# Patient Record
Sex: FEMALE | Race: ASIAN | NOT HISPANIC OR LATINO
[De-identification: names, ages, dates, MRNs, and addresses within clinical notes are randomized per-mention and may not be internally consistent; named-entity substitution may affect disease eponyms.]

---
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
## 2020-04-09 ENCOUNTER — APPOINTMENT (OUTPATIENT)
Dept: DERMATOLOGY | Facility: CLINIC | Age: 20
End: 2020-04-09

## 2020-06-02 ENCOUNTER — RX RENEWAL (OUTPATIENT)
Age: 20
End: 2020-06-02

## 2020-08-13 ENCOUNTER — APPOINTMENT (OUTPATIENT)
Dept: INTERNAL MEDICINE | Facility: CLINIC | Age: 20
End: 2020-08-13

## 2020-08-14 DIAGNOSIS — Z20.828 CONTACT WITH AND (SUSPECTED) EXPOSURE TO OTHER VIRAL COMMUNICABLE DISEASES: ICD-10-CM

## 2020-08-18 LAB — SARS-COV-2 N GENE NPH QL NAA+PROBE: NOT DETECTED

## 2021-03-19 ENCOUNTER — NON-APPOINTMENT (OUTPATIENT)
Age: 21
End: 2021-03-19

## 2022-03-10 ENCOUNTER — APPOINTMENT (OUTPATIENT)
Dept: INTERNAL MEDICINE | Facility: CLINIC | Age: 22
End: 2022-03-10
Payer: COMMERCIAL

## 2022-03-10 VITALS
OXYGEN SATURATION: 98 % | DIASTOLIC BLOOD PRESSURE: 72 MMHG | HEIGHT: 65 IN | WEIGHT: 125 LBS | SYSTOLIC BLOOD PRESSURE: 109 MMHG | TEMPERATURE: 97.8 F | HEART RATE: 101 BPM | BODY MASS INDEX: 20.83 KG/M2

## 2022-03-10 DIAGNOSIS — N94.6 DYSMENORRHEA, UNSPECIFIED: ICD-10-CM

## 2022-03-10 PROCEDURE — 36415 COLL VENOUS BLD VENIPUNCTURE: CPT

## 2022-03-10 PROCEDURE — 99395 PREV VISIT EST AGE 18-39: CPT | Mod: 25

## 2022-03-10 RX ORDER — DOXYCYCLINE HYCLATE 100 MG/1
100 TABLET ORAL DAILY
Qty: 30 | Refills: 2 | Status: DISCONTINUED | COMMUNITY
Start: 2019-12-31 | End: 2022-03-10

## 2022-03-10 RX ORDER — MULTIVITAMIN
TABLET ORAL
Refills: 0 | Status: ACTIVE | COMMUNITY

## 2022-03-10 NOTE — HISTORY OF PRESENT ILLNESS
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 \par \par Had some jaw pain (L); saw orthodontist and told she has a misaligned bite.\par \par Went to GYN 8/2021 and had pelvic u/s due to sharp pains during ovulation. \par Dr. Boateng [phone].\par Painful periods but monthly. \par Takes naproxen prn. \par No pap smear. \par \par Appetite good - chicken, steak, salmon. Fruits/vegetables. Has milk\par BMs daily\par Urinating well.\par \par Sleep ok.\par \par Stopped therapy due to covid. Mood has ok. \par \par Sees derm yearly for acne.\par \par No smoking. No EtOH. NO drugs.\par Lives with parents.\par Never sexually active.

## 2022-03-10 NOTE — HEALTH RISK ASSESSMENT
[0] : 2) Feeling down, depressed, or hopeless: Not at all (0) [PHQ-2 Negative - No further assessment needed] : PHQ-2 Negative - No further assessment needed [[id number]] : 0 [HIV test declined] : HIV test declined [Hepatitis C test offered] : Hepatitis C test offered

## 2022-03-10 NOTE — ASSESSMENT
[FreeTextEntry1] : Healthy 20 y/o F here for CPE.\par Doing well. No concerns.\par \par acne - controlled\par - Continue topical meds\par - F/U with Derm\par \par Dysmenorrhea\par - Continue naproxen\par \par HCM:\par - Received covid vaccine x 3\par - Tdap Jan 2022\par - Completed HPV vaccine\par - Check labs as ordered - last cholesterol 2017\par - F/U with GYN for pap - will get records\par \par RTC in 1 year or sooner prn.

## 2022-03-15 LAB
ALBUMIN SERPL ELPH-MCNC: 4.6 G/DL
ALP BLD-CCNC: 69 U/L
ALT SERPL-CCNC: 10 U/L
ANION GAP SERPL CALC-SCNC: 12 MMOL/L
AST SERPL-CCNC: 16 U/L
BASOPHILS # BLD AUTO: 0.04 K/UL
BASOPHILS NFR BLD AUTO: 0.4 %
BILIRUB SERPL-MCNC: 0.4 MG/DL
BUN SERPL-MCNC: 11 MG/DL
CALCIUM SERPL-MCNC: 9.7 MG/DL
CHLORIDE SERPL-SCNC: 104 MMOL/L
CHOLEST SERPL-MCNC: 187 MG/DL
CO2 SERPL-SCNC: 23 MMOL/L
CREAT SERPL-MCNC: 0.88 MG/DL
EGFR: 96 ML/MIN/1.73M2
EOSINOPHIL # BLD AUTO: 0.17 K/UL
EOSINOPHIL NFR BLD AUTO: 1.6 %
GLUCOSE SERPL-MCNC: 89 MG/DL
HCT VFR BLD CALC: 43.1 %
HCV AB SER QL: NONREACTIVE
HCV S/CO RATIO: 0.1 S/CO
HDLC SERPL-MCNC: 72 MG/DL
HGB BLD-MCNC: 14 G/DL
IMM GRANULOCYTES NFR BLD AUTO: 0.2 %
LDLC SERPL CALC-MCNC: 106 MG/DL
LYMPHOCYTES # BLD AUTO: 2.87 K/UL
LYMPHOCYTES NFR BLD AUTO: 27.4 %
MAN DIFF?: NORMAL
MCHC RBC-ENTMCNC: 31.2 PG
MCHC RBC-ENTMCNC: 32.5 GM/DL
MCV RBC AUTO: 96 FL
MONOCYTES # BLD AUTO: 0.58 K/UL
MONOCYTES NFR BLD AUTO: 5.5 %
NEUTROPHILS # BLD AUTO: 6.79 K/UL
NEUTROPHILS NFR BLD AUTO: 64.9 %
NONHDLC SERPL-MCNC: 115 MG/DL
PLATELET # BLD AUTO: 361 K/UL
POTASSIUM SERPL-SCNC: 4.6 MMOL/L
PROT SERPL-MCNC: 7.1 G/DL
RBC # BLD: 4.49 M/UL
RBC # FLD: 11.9 %
SODIUM SERPL-SCNC: 139 MMOL/L
TRIGL SERPL-MCNC: 45 MG/DL
WBC # FLD AUTO: 10.47 K/UL

## 2022-03-29 ENCOUNTER — RX RENEWAL (OUTPATIENT)
Age: 22
End: 2022-03-29

## 2023-02-07 ENCOUNTER — NON-APPOINTMENT (OUTPATIENT)
Age: 23
End: 2023-02-07

## 2023-11-21 ENCOUNTER — APPOINTMENT (OUTPATIENT)
Age: 23
End: 2023-11-21

## 2023-11-21 ENCOUNTER — APPOINTMENT (OUTPATIENT)
Age: 23
End: 2023-11-21
Payer: COMMERCIAL

## 2023-11-21 PROCEDURE — D1110 PROPHYLAXIS - ADULT: CPT

## 2023-11-21 PROCEDURE — D0120: CPT

## 2023-11-28 ENCOUNTER — APPOINTMENT (OUTPATIENT)
Dept: INTERNAL MEDICINE | Facility: CLINIC | Age: 23
End: 2023-11-28
Payer: COMMERCIAL

## 2023-11-28 VITALS
DIASTOLIC BLOOD PRESSURE: 71 MMHG | BODY MASS INDEX: 20.57 KG/M2 | HEIGHT: 66 IN | HEART RATE: 69 BPM | TEMPERATURE: 98.2 F | WEIGHT: 128 LBS | SYSTOLIC BLOOD PRESSURE: 108 MMHG | OXYGEN SATURATION: 99 %

## 2023-11-28 DIAGNOSIS — Z00.00 ENCOUNTER FOR GENERAL ADULT MEDICAL EXAMINATION W/OUT ABNORMAL FINDINGS: ICD-10-CM

## 2023-11-28 PROCEDURE — 99395 PREV VISIT EST AGE 18-39: CPT

## 2023-12-05 ENCOUNTER — RESULT REVIEW (OUTPATIENT)
Age: 23
End: 2023-12-05

## 2023-12-05 DIAGNOSIS — R07.81 PLEURODYNIA: ICD-10-CM

## 2023-12-07 ENCOUNTER — OUTPATIENT (OUTPATIENT)
Dept: OUTPATIENT SERVICES | Facility: HOSPITAL | Age: 23
LOS: 1 days | End: 2023-12-07
Payer: COMMERCIAL

## 2023-12-07 ENCOUNTER — APPOINTMENT (OUTPATIENT)
Dept: RADIOLOGY | Facility: IMAGING CENTER | Age: 23
End: 2023-12-07
Payer: COMMERCIAL

## 2023-12-07 DIAGNOSIS — R20.0 ANESTHESIA OF SKIN: ICD-10-CM

## 2023-12-07 DIAGNOSIS — R07.89 OTHER CHEST PAIN: ICD-10-CM

## 2023-12-07 DIAGNOSIS — R07.81 PLEURODYNIA: ICD-10-CM

## 2023-12-07 PROCEDURE — 71046 X-RAY EXAM CHEST 2 VIEWS: CPT | Mod: 26

## 2023-12-07 PROCEDURE — 72040 X-RAY EXAM NECK SPINE 2-3 VW: CPT | Mod: 26

## 2023-12-07 PROCEDURE — 72040 X-RAY EXAM NECK SPINE 2-3 VW: CPT

## 2023-12-07 PROCEDURE — 71046 X-RAY EXAM CHEST 2 VIEWS: CPT

## 2023-12-08 ENCOUNTER — TRANSCRIPTION ENCOUNTER (OUTPATIENT)
Age: 23
End: 2023-12-08

## 2023-12-21 ENCOUNTER — APPOINTMENT (OUTPATIENT)
Dept: INTERNAL MEDICINE | Facility: CLINIC | Age: 23
End: 2023-12-21
Payer: COMMERCIAL

## 2023-12-21 VITALS
SYSTOLIC BLOOD PRESSURE: 112 MMHG | DIASTOLIC BLOOD PRESSURE: 78 MMHG | HEART RATE: 65 BPM | WEIGHT: 125 LBS | RESPIRATION RATE: 15 BRPM | OXYGEN SATURATION: 99 %

## 2023-12-21 PROCEDURE — 99214 OFFICE O/P EST MOD 30 MIN: CPT

## 2023-12-22 ENCOUNTER — NON-APPOINTMENT (OUTPATIENT)
Age: 23
End: 2023-12-22

## 2023-12-30 NOTE — HISTORY OF PRESENT ILLNESS
[FreeTextEntry8] : Has had multiple concerns x 5-6 weeks.  Areas of pain in upper back, L ribs, and sternum. Comes and goes. Has gotten somewhat better with ibuprofen and much milder, but still present.  Numbness/tingling in the L chest wall.  Neck pain - on the L side.  No strenuous activity  Does recall now that she fell while killing a bug and fell onto her shins 6 weeks ago, and fell onto the table as was standing on it. Had bruising but resolved.  Otherwise feels well without joint pain, fevers. Eating well. Normal output.

## 2023-12-30 NOTE — ASSESSMENT
[FreeTextEntry1] : 24 y/o F with 5-6 week history of L thoracic wall/sternal/scapular pain. Otherwise well. Improved with motrin. Did have fall preceding onset. Normal cxr and cervical spine xray on 12/11. Unclear etiology, but given recent fall, may be muscular strain. However, will check inflammatory markers for autoimmune process. - Check labs as ordered - Continue NSAIDs prn - If sx persist, consider, thoracic and cervical spine MRI

## 2023-12-30 NOTE — PHYSICAL EXAM
[Normal] : normal rate, regular rhythm, normal S1 and S2 and no murmur heard [de-identified] : Full ROM of neck and L shoulder. No tenderness to palpation along L scapula, shoulder or along L chest wall. No bruising.

## 2024-01-02 ENCOUNTER — APPOINTMENT (OUTPATIENT)
Dept: MRI IMAGING | Facility: IMAGING CENTER | Age: 24
End: 2024-01-02

## 2024-01-02 ENCOUNTER — OUTPATIENT (OUTPATIENT)
Dept: OUTPATIENT SERVICES | Facility: HOSPITAL | Age: 24
LOS: 1 days | End: 2024-01-02
Payer: COMMERCIAL

## 2024-01-02 ENCOUNTER — APPOINTMENT (OUTPATIENT)
Dept: MRI IMAGING | Facility: IMAGING CENTER | Age: 24
End: 2024-01-02
Payer: COMMERCIAL

## 2024-01-02 DIAGNOSIS — G56.92 UNSPECIFIED MONONEUROPATHY OF LEFT UPPER LIMB: ICD-10-CM

## 2024-01-02 DIAGNOSIS — Z00.8 ENCOUNTER FOR OTHER GENERAL EXAMINATION: ICD-10-CM

## 2024-01-02 LAB
ALBUMIN SERPL ELPH-MCNC: 4.6 G/DL
ALP BLD-CCNC: 73 U/L
ALT SERPL-CCNC: 9 U/L
ANION GAP SERPL CALC-SCNC: 13 MMOL/L
AST SERPL-CCNC: 17 U/L
BILIRUB SERPL-MCNC: 0.4 MG/DL
BUN SERPL-MCNC: 9 MG/DL
CALCIUM SERPL-MCNC: 9.8 MG/DL
CHLORIDE SERPL-SCNC: 104 MMOL/L
CHOLEST SERPL-MCNC: 198 MG/DL
CO2 SERPL-SCNC: 23 MMOL/L
CREAT SERPL-MCNC: 0.86 MG/DL
CRP SERPL-MCNC: <3 MG/L
EGFR: 97 ML/MIN/1.73M2
ERYTHROCYTE [SEDIMENTATION RATE] IN BLOOD BY WESTERGREN METHOD: 8 MM/HR
ESTIMATED AVERAGE GLUCOSE: 105 MG/DL
FOLATE SERPL-MCNC: 15.2 NG/ML
GLUCOSE SERPL-MCNC: 82 MG/DL
HBA1C MFR BLD HPLC: 5.3 %
HCT VFR BLD CALC: 41.2 %
HDLC SERPL-MCNC: 71 MG/DL
HGB BLD-MCNC: 13.4 G/DL
LDLC SERPL CALC-MCNC: 117 MG/DL
MCHC RBC-ENTMCNC: 30 PG
MCHC RBC-ENTMCNC: 32.5 GM/DL
MCV RBC AUTO: 92.2 FL
NONHDLC SERPL-MCNC: 127 MG/DL
PLATELET # BLD AUTO: 324 K/UL
POTASSIUM SERPL-SCNC: 4.3 MMOL/L
PROT SERPL-MCNC: 7.1 G/DL
RBC # BLD: 4.47 M/UL
RBC # FLD: 11.9 %
SODIUM SERPL-SCNC: 139 MMOL/L
TRIGL SERPL-MCNC: 53 MG/DL
VIT B12 SERPL-MCNC: 556 PG/ML
WBC # FLD AUTO: 7.53 K/UL

## 2024-01-02 PROCEDURE — 72146 MRI CHEST SPINE W/O DYE: CPT | Mod: 26

## 2024-01-02 PROCEDURE — 72141 MRI NECK SPINE W/O DYE: CPT

## 2024-01-02 PROCEDURE — 72146 MRI CHEST SPINE W/O DYE: CPT

## 2024-01-02 PROCEDURE — 72141 MRI NECK SPINE W/O DYE: CPT | Mod: 26

## 2024-01-08 DIAGNOSIS — E04.1 NONTOXIC SINGLE THYROID NODULE: ICD-10-CM

## 2024-01-08 DIAGNOSIS — M94.0 CHONDROCOSTAL JUNCTION SYNDROME [TIETZE]: ICD-10-CM

## 2024-01-08 DIAGNOSIS — D49.7 NEOPLASM OF UNSPECIFIED BEHAVIOR OF ENDOCRINE GLANDS AND OTHER PARTS OF NERVOUS SYSTEM: ICD-10-CM

## 2024-01-12 ENCOUNTER — APPOINTMENT (OUTPATIENT)
Dept: PHYSICAL MEDICINE AND REHAB | Facility: CLINIC | Age: 24
End: 2024-01-12
Payer: COMMERCIAL

## 2024-01-12 ENCOUNTER — OUTPATIENT (OUTPATIENT)
Dept: OUTPATIENT SERVICES | Facility: HOSPITAL | Age: 24
LOS: 1 days | End: 2024-01-12
Payer: COMMERCIAL

## 2024-01-12 ENCOUNTER — APPOINTMENT (OUTPATIENT)
Dept: ULTRASOUND IMAGING | Facility: IMAGING CENTER | Age: 24
End: 2024-01-12
Payer: COMMERCIAL

## 2024-01-12 ENCOUNTER — NON-APPOINTMENT (OUTPATIENT)
Age: 24
End: 2024-01-12

## 2024-01-12 DIAGNOSIS — D49.7 NEOPLASM OF UNSPECIFIED BEHAVIOR OF ENDOCRINE GLANDS AND OTHER PARTS OF NERVOUS SYSTEM: ICD-10-CM

## 2024-01-12 DIAGNOSIS — E04.1 NONTOXIC SINGLE THYROID NODULE: ICD-10-CM

## 2024-01-12 DIAGNOSIS — R20.0 ANESTHESIA OF SKIN: ICD-10-CM

## 2024-01-12 PROCEDURE — 76536 US EXAM OF HEAD AND NECK: CPT

## 2024-01-12 PROCEDURE — 99204 OFFICE O/P NEW MOD 45 MIN: CPT

## 2024-01-12 PROCEDURE — 76536 US EXAM OF HEAD AND NECK: CPT | Mod: 26

## 2024-01-12 NOTE — PHYSICAL EXAM
[Normal] : Alert and in no acute distress [de-identified] : no pain with cervical spine ROM, negative spurlings, +trigger points left trapezius  [de-identified] : breathing comfortably [de-identified] : warm, well perfused  [de-identified] : strength 5/5, gait normal +tinel's left elbow  [de-identified] : reflexes symmetric, sensation intact to light touch

## 2024-01-12 NOTE — HISTORY OF PRESENT ILLNESS
[FreeTextEntry1] : Patient is a 23 year old woman who presents with her mother for evaluation. She complains of neck pain x more than one month, left side to left upper back, arm, with numbness and tingling in left arm x 2 weeks. Symptoms come and go, she has been taking Ibuprofen as needed for pain. No PT. Xray cervical and MRI of cervical and thoracic spine were done January 2024 with no disc herniations, no narrowing at any level. Patient works for [employer], no changes in job requirements, sits at desk. Also has discomfort in anterior chest/sternum, left chest wall. Feels her left hand  is weaker.

## 2024-01-12 NOTE — ASSESSMENT
[FreeTextEntry1] : 23 year old woman with neck pain, left side to left arm, with numbness left chest wall/anterior chest pain previous notes, MRI reviewed, no disc herniations/narrowing noted  start PT for ROM, stretching, HEP daily stretching at home, with heat ibuprofen with food, as needed  discussed muscle relaxants, trigger point injections will do EMG upper extremities to r/o ulnar nerve entrapment patient to avoid leaning on left elbow patient to call office with any worsening pain, new weakness

## 2024-01-12 NOTE — REVIEW OF SYSTEMS
[Fever] : no fever [Chills] : no chills [Chest Pain] : no chest pain [Palpitations] : no palpitations [Shortness Of Breath] : no shortness of breath [Headache] : no headaches [Dizziness] : no dizziness

## 2024-01-17 ENCOUNTER — NON-APPOINTMENT (OUTPATIENT)
Age: 24
End: 2024-01-17

## 2024-01-17 ENCOUNTER — APPOINTMENT (OUTPATIENT)
Dept: CARDIOLOGY | Facility: CLINIC | Age: 24
End: 2024-01-17
Payer: COMMERCIAL

## 2024-01-17 VITALS
SYSTOLIC BLOOD PRESSURE: 115 MMHG | BODY MASS INDEX: 20.09 KG/M2 | HEIGHT: 66 IN | HEART RATE: 101 BPM | DIASTOLIC BLOOD PRESSURE: 80 MMHG | TEMPERATURE: 98 F | WEIGHT: 125 LBS | OXYGEN SATURATION: 98 %

## 2024-01-17 DIAGNOSIS — R07.89 OTHER CHEST PAIN: ICD-10-CM

## 2024-01-17 DIAGNOSIS — E78.00 PURE HYPERCHOLESTEROLEMIA, UNSPECIFIED: ICD-10-CM

## 2024-01-17 PROCEDURE — 93000 ELECTROCARDIOGRAM COMPLETE: CPT

## 2024-01-17 PROCEDURE — 99204 OFFICE O/P NEW MOD 45 MIN: CPT

## 2024-01-25 PROBLEM — R07.89 ATYPICAL CHEST PAIN: Status: ACTIVE | Noted: 2023-12-05

## 2024-01-26 ENCOUNTER — APPOINTMENT (OUTPATIENT)
Dept: PHYSICAL MEDICINE AND REHAB | Facility: CLINIC | Age: 24
End: 2024-01-26
Payer: COMMERCIAL

## 2024-01-26 VITALS
OXYGEN SATURATION: 100 % | DIASTOLIC BLOOD PRESSURE: 74 MMHG | HEART RATE: 93 BPM | RESPIRATION RATE: 14 BRPM | SYSTOLIC BLOOD PRESSURE: 116 MMHG | TEMPERATURE: 98.2 F

## 2024-01-26 PROCEDURE — 95885 MUSC TST DONE W/NERV TST LIM: CPT

## 2024-01-26 PROCEDURE — 95911 NRV CNDJ TEST 9-10 STUDIES: CPT

## 2024-01-26 NOTE — ASSESSMENT
[FreeTextEntry1] : 23 year old woman with left arm pain, numbness EMG/NCV with no evidence for nerve entrapment or radiculopathy on left  patient will continue with PT

## 2024-01-26 NOTE — PROCEDURE
[Consent] : Prior to procedure, consent was given by patient or guardian [Explanation of Risk/Benefits] : Explanation of risk/benefits provided [Nerve Conduction Velocity] : Nerve Conduction Velocity conducted. The following nerves were tested: [EMG] : EMG testing conducted. The following Muscles were tested;

## 2024-03-01 ENCOUNTER — APPOINTMENT (OUTPATIENT)
Dept: PHYSICAL MEDICINE AND REHAB | Facility: CLINIC | Age: 24
End: 2024-03-01
Payer: COMMERCIAL

## 2024-03-01 DIAGNOSIS — M79.18 MYALGIA, OTHER SITE: ICD-10-CM

## 2024-03-01 PROCEDURE — 99213 OFFICE O/P EST LOW 20 MIN: CPT

## 2024-03-01 RX ORDER — CYCLOBENZAPRINE HYDROCHLORIDE 5 MG/1
5 TABLET, FILM COATED ORAL
Qty: 20 | Refills: 1 | Status: ACTIVE | COMMUNITY
Start: 2024-03-01 | End: 1900-01-01

## 2024-03-01 NOTE — PHYSICAL EXAM
[Normal] : Alert and in no acute distress [de-identified] : no pain with cervical spine ROM, negative spurlings, +trigger points left trapezius  [de-identified] : breathing comfortably [de-identified] : warm, well perfused  [de-identified] : strength 5/5, gait normal left shoulder pain with end range of motion, full int/ext rotation, +Neers [de-identified] : reflexes symmetric, sensation intact to light touch

## 2024-03-01 NOTE — HISTORY OF PRESENT ILLNESS
[FreeTextEntry1] : Patient is a 23 year old woman who presents with her mother for follow up. She was last seen in January. Today, she reports her pain is better, more localized around the left shoulder and neck. She was previously also having pain in chest/ribs/trunk. Physical therapy gives her some relief, but pain returns, now in upper back and posterior shoulder. She feels a clicking sensation when she moves her left shoulder, reaching, with pain on certain positions. There is some occasional numbness to left forearm, but mostly to left upper arm. Her neck and upper back feel tight to left ear/jaw. No weakness reported. She is stretching on her own, usually sleeps at 1 am. Works at a desk. No other activities like dancing, swimming. Takes advil/tylenol as needed for pain

## 2024-03-01 NOTE — ASSESSMENT
[FreeTextEntry1] : 23 year old woman with left shoulder, upper back pain/neck pain previous notes reviewed pain started after standing on/falling from table onto her knees some improvement with PT continue PT, daily stretching, with heat MRI left shoulder, will call patient with results  previous imaging cervical/thoracic spine with no disc herniations flexeril at night 5-10 mg for spasm meloxicam daily x 1-2 weeks with food, then as needed  discussed trigger point injections

## 2024-03-09 ENCOUNTER — APPOINTMENT (OUTPATIENT)
Dept: MRI IMAGING | Facility: IMAGING CENTER | Age: 24
End: 2024-03-09
Payer: COMMERCIAL

## 2024-03-09 ENCOUNTER — OUTPATIENT (OUTPATIENT)
Dept: OUTPATIENT SERVICES | Facility: HOSPITAL | Age: 24
LOS: 1 days | End: 2024-03-09
Payer: COMMERCIAL

## 2024-03-09 DIAGNOSIS — M25.512 PAIN IN LEFT SHOULDER: ICD-10-CM

## 2024-03-09 DIAGNOSIS — Z00.8 ENCOUNTER FOR OTHER GENERAL EXAMINATION: ICD-10-CM

## 2024-03-09 PROCEDURE — 73221 MRI JOINT UPR EXTREM W/O DYE: CPT

## 2024-03-09 PROCEDURE — 73221 MRI JOINT UPR EXTREM W/O DYE: CPT | Mod: 26,LT

## 2024-03-12 ENCOUNTER — APPOINTMENT (OUTPATIENT)
Dept: INTERNAL MEDICINE | Facility: CLINIC | Age: 24
End: 2024-03-12
Payer: COMMERCIAL

## 2024-03-12 VITALS
HEIGHT: 66 IN | SYSTOLIC BLOOD PRESSURE: 104 MMHG | HEART RATE: 87 BPM | DIASTOLIC BLOOD PRESSURE: 71 MMHG | TEMPERATURE: 99.3 F | OXYGEN SATURATION: 98 % | BODY MASS INDEX: 20.57 KG/M2 | WEIGHT: 128 LBS

## 2024-03-12 DIAGNOSIS — G56.92 UNSPECIFIED MONONEUROPATHY OF LEFT UPPER LIMB: ICD-10-CM

## 2024-03-12 PROCEDURE — 99214 OFFICE O/P EST MOD 30 MIN: CPT

## 2024-03-12 NOTE — REVIEW OF SYSTEMS
[Muscle Pain] : muscle pain [Joint Stiffness] : joint stiffness [Back Pain] : back pain [Negative] : Gastrointestinal

## 2024-03-12 NOTE — ASSESSMENT
[FreeTextEntry1] : 24 y/o F with LUE/L shoulder pain/tingling since Nov 2023. Had mid-sternal and mid upper back pain which has improved. ROM of L shoulder has improved. Suspect more likely MSK injury rather than rheum/autoimmune (had negative labs in Dec 2023). - Would continue PT, stretches, heat - Awaiting MRI read of L shoulder done on 3/9 - Continue PM&R follow up - Can re-try meloxicam (previously tolerated NSIADs)

## 2024-03-12 NOTE — PHYSICAL EXAM
[Normal] : no carotid or abdominal bruits heard, no varicosities, pedal pulses are present, no peripheral edema, no extremity clubbing or cyanosis and no palpable aorta [Grossly Normal Strength/Tone] : grossly normal strength/tone [de-identified] : Full ROM of L shoulder. 5/5 strength bilaterally. Slightly decreased sensation to light touch on L hand. Otherwise normal sensation. No tenderness to palpation of neck/shoulder/ribs bilaterally.

## 2024-03-12 NOTE — HISTORY OF PRESENT ILLNESS
[FreeTextEntry1] : Follow up [de-identified] : Continues to have pain in upper back and posterior shoulder. Saw PM&R on 3/1. Referred for L shoulder MRI and advised to take flexeril and meloxicam.  Mid sternal and midline back pain have improved with PT stretching. Bilateral ribs continue to hurt. Also with L upper chest pain and L shoulder pain extending to the upper arm with soreness to elbow.  R arm is fine. Mild soreness in R lower neck. L scapular does improve.  Pain can occur at rest. Now with tingling all along LUE. Used to be just in the fingers.   Took cyclobenzaprine and meloxicam and then developed hives after 2 doses.  No known h/o autoimmune or rheumatologic disease in her family. Mom has arthritis No rashes.  Knees will pop but otherwise no stiffness/joint pain.   Continues PT weekly and doing heating pad and home stretches.  No malar rash.  Used to play violin a long time ago. Did have a fall while reaching for something/standing on a table prior to the onset of this.

## 2024-03-25 ENCOUNTER — RX RENEWAL (OUTPATIENT)
Age: 24
End: 2024-03-25

## 2024-03-25 RX ORDER — MELOXICAM 15 MG/1
15 TABLET ORAL
Qty: 30 | Refills: 0 | Status: ACTIVE | COMMUNITY
Start: 2024-03-01 | End: 1900-01-01

## 2024-03-28 ENCOUNTER — APPOINTMENT (OUTPATIENT)
Dept: INTERNAL MEDICINE | Facility: CLINIC | Age: 24
End: 2024-03-28

## 2024-03-29 ENCOUNTER — OUTPATIENT (OUTPATIENT)
Dept: OUTPATIENT SERVICES | Facility: HOSPITAL | Age: 24
LOS: 1 days | End: 2024-03-29
Payer: COMMERCIAL

## 2024-03-29 ENCOUNTER — APPOINTMENT (OUTPATIENT)
Dept: CV DIAGNOSITCS | Facility: HOSPITAL | Age: 24
End: 2024-03-29

## 2024-03-29 ENCOUNTER — RESULT REVIEW (OUTPATIENT)
Age: 24
End: 2024-03-29

## 2024-03-29 ENCOUNTER — TRANSCRIPTION ENCOUNTER (OUTPATIENT)
Age: 24
End: 2024-03-29

## 2024-03-29 DIAGNOSIS — R07.89 OTHER CHEST PAIN: ICD-10-CM

## 2024-03-29 PROCEDURE — 93306 TTE W/DOPPLER COMPLETE: CPT | Mod: 26

## 2024-04-01 ENCOUNTER — TRANSCRIPTION ENCOUNTER (OUTPATIENT)
Age: 24
End: 2024-04-01

## 2024-04-01 ENCOUNTER — APPOINTMENT (OUTPATIENT)
Dept: RHEUMATOLOGY | Facility: CLINIC | Age: 24
End: 2024-04-01
Payer: COMMERCIAL

## 2024-04-01 VITALS
HEIGHT: 66 IN | HEART RATE: 106 BPM | WEIGHT: 130 LBS | BODY MASS INDEX: 20.89 KG/M2 | DIASTOLIC BLOOD PRESSURE: 79 MMHG | OXYGEN SATURATION: 98 % | SYSTOLIC BLOOD PRESSURE: 114 MMHG

## 2024-04-01 DIAGNOSIS — M79.89 OTHER SPECIFIED SOFT TISSUE DISORDERS: ICD-10-CM

## 2024-04-01 DIAGNOSIS — R07.89 OTHER CHEST PAIN: ICD-10-CM

## 2024-04-01 PROCEDURE — 99204 OFFICE O/P NEW MOD 45 MIN: CPT

## 2024-04-01 NOTE — PHYSICAL EXAM
[General Appearance - Alert] : alert [General Appearance - In No Acute Distress] : in no acute distress [Impaired Insight] : insight and judgment were intact [Respiration, Rhythm And Depth] : normal respiratory rhythm and effort [Musculoskeletal - Swelling] : no joint swelling seen [FreeTextEntry1] : tenderness prominence under left axilla  [Motor Tone] : muscle strength and tone were normal

## 2024-04-01 NOTE — CONSULT LETTER
[Dear  ___] : Dear  [unfilled], [Please see my note below.] : Please see my note below. [Sincerely,] : Sincerely, [FreeTextEntry3] : Natalia Jerome MD , [facility] at [facility] Division of Rheumatology

## 2024-04-01 NOTE — HISTORY OF PRESENT ILLNESS
[FreeTextEntry1] : Referred for evaluation of joint pain -Onset around October 2023 -pain in the left shoulder, radiating to the upper neck and down the arm/ hand initially with numbness of the entire arm/ hand, now only neck and upper arm no weakness no other joint pain or swelling no rashes no dry eyes or mouth no Raynaud's  had transient chest pain, resolved denies any preceding trauma or injury to the area noticed some swelling under left axilla  no personal of family history of psoriasis or IBD no history of back pain   -saw PMD- labs unrevealing -saw PMR EMG unrevealing  -MRI shoulder unrevealing

## 2024-04-01 NOTE — DATA REVIEWED
[FreeTextEntry1] : Labs, imaging and chart notes reviewed today with patient  MRI shoulder unrevealing  normal inflammatory markers

## 2024-04-01 NOTE — ASSESSMENT
[FreeTextEntry1] : Left shoulder/nek pain, radiating to the upper neck and arm/ hand No preceding trauma or injury No weakness or synovitis on exam Normal EMG and imaging Normal inflammatory markers Rheumatologic ROS unrevealing ~doubt inflammatory process given above would recheck ESR and CRP today continue with PT and NSAIDs + muscle relaxant as needed if worsening symptoms would reassess and send additional work up  # swelling left axilla reported by patient dedicated US ordered

## 2024-04-02 ENCOUNTER — OUTPATIENT (OUTPATIENT)
Dept: OUTPATIENT SERVICES | Facility: HOSPITAL | Age: 24
LOS: 1 days | End: 2024-04-02
Payer: COMMERCIAL

## 2024-04-02 ENCOUNTER — APPOINTMENT (OUTPATIENT)
Dept: ULTRASOUND IMAGING | Facility: IMAGING CENTER | Age: 24
End: 2024-04-02
Payer: COMMERCIAL

## 2024-04-02 DIAGNOSIS — M79.89 OTHER SPECIFIED SOFT TISSUE DISORDERS: ICD-10-CM

## 2024-04-02 PROCEDURE — 76882 US LMTD JT/FCL EVL NVASC XTR: CPT

## 2024-04-02 PROCEDURE — 76882 US LMTD JT/FCL EVL NVASC XTR: CPT | Mod: 26,LT

## 2024-04-05 ENCOUNTER — TRANSCRIPTION ENCOUNTER (OUTPATIENT)
Age: 24
End: 2024-04-05

## 2024-04-08 ENCOUNTER — TRANSCRIPTION ENCOUNTER (OUTPATIENT)
Age: 24
End: 2024-04-08

## 2024-04-14 ENCOUNTER — NON-APPOINTMENT (OUTPATIENT)
Age: 24
End: 2024-04-14

## 2024-04-14 LAB
CRP SERPL-MCNC: <3 MG/L
ERYTHROCYTE [SEDIMENTATION RATE] IN BLOOD BY WESTERGREN METHOD: 15 MM/HR

## 2024-04-15 ENCOUNTER — TRANSCRIPTION ENCOUNTER (OUTPATIENT)
Age: 24
End: 2024-04-15

## 2024-04-18 ENCOUNTER — TRANSCRIPTION ENCOUNTER (OUTPATIENT)
Age: 24
End: 2024-04-18

## 2024-05-30 ENCOUNTER — APPOINTMENT (OUTPATIENT)
Dept: OTOLARYNGOLOGY | Facility: CLINIC | Age: 24
End: 2024-05-30
Payer: COMMERCIAL

## 2024-05-30 VITALS
DIASTOLIC BLOOD PRESSURE: 75 MMHG | SYSTOLIC BLOOD PRESSURE: 107 MMHG | HEART RATE: 69 BPM | BODY MASS INDEX: 20.89 KG/M2 | OXYGEN SATURATION: 99 % | HEIGHT: 66 IN | WEIGHT: 130 LBS

## 2024-05-30 DIAGNOSIS — H92.02 OTALGIA, LEFT EAR: ICD-10-CM

## 2024-05-30 PROCEDURE — 99203 OFFICE O/P NEW LOW 30 MIN: CPT

## 2024-06-03 NOTE — CONSULT LETTER
[Dear  ___] : Dear  [unfilled], [Consult Letter:] : I had the pleasure of evaluating your patient, [unfilled]. [Sincerely,] : Sincerely, [Consult Closing:] : Thank you very much for allowing me to participate in the care of this patient.  If you have any questions, please do not hesitate to contact me. [Please see my note below.] : Please see my note below. [FreeTextEntry3] : Katrin Miramontes MD Otolaryngology- Facial Plastics  [address] [place] [id number] (P) - [phone] (F) - [phone]

## 2024-06-03 NOTE — ASSESSMENT
[FreeTextEntry1] : Ms. MARLOW is a 23 year female with L otalgia for a month now improving  - we discussed this is likely muscular and could be due to the recent completion of Invisilign - would rec Myofascial release at this point - Mandible xray due to clicking of L jaw - will call with results - f/u tbd

## 2024-06-03 NOTE — END OF VISIT
[FreeTextEntry3] : I personally saw and examined MICHAEL MARLOW  in detail. I spoke to SANJEEV Choe regarding the assessment and plan of care. I performed the procedures and relevant physical exam. I have made changes to the body of the note wherever necessary and appropriate.

## 2024-06-03 NOTE — HISTORY OF PRESENT ILLNESS
[de-identified] : Ms. MARLOW is a 23 year female c/o L ear tenderness / pressure and muffling which she first noticed a month ago, episodes lasted a few seconds, she had L tinnitus as well which would come and go and is now less frequent. pain would radiate to her L cheek recently seen by another ENT and told she has inflammation in the jaw joint, she has been using warm compresses which seems to have helped, she reports audio was normal - denies nasal congestion - pain started 2 weeks after Invisilign was completed

## 2024-06-03 NOTE — PHYSICAL EXAM
[Midline] : trachea located in midline position [Normal] : temporomandibular joint is normal [de-identified] : mild hypertonicity of masseters b/l  [de-identified] : no clicking

## 2024-06-05 ENCOUNTER — OUTPATIENT (OUTPATIENT)
Dept: OUTPATIENT SERVICES | Facility: HOSPITAL | Age: 24
LOS: 1 days | End: 2024-06-05
Payer: COMMERCIAL

## 2024-06-05 ENCOUNTER — APPOINTMENT (OUTPATIENT)
Dept: RADIOLOGY | Facility: IMAGING CENTER | Age: 24
End: 2024-06-05
Payer: COMMERCIAL

## 2024-06-05 DIAGNOSIS — H92.02 OTALGIA, LEFT EAR: ICD-10-CM

## 2024-06-05 PROCEDURE — 70110 X-RAY EXAM OF JAW 4/> VIEWS: CPT

## 2024-06-05 PROCEDURE — 70110 X-RAY EXAM OF JAW 4/> VIEWS: CPT | Mod: 26

## 2024-06-10 ENCOUNTER — NON-APPOINTMENT (OUTPATIENT)
Age: 24
End: 2024-06-10

## 2024-06-17 ENCOUNTER — APPOINTMENT (OUTPATIENT)
Dept: DERMATOLOGY | Facility: CLINIC | Age: 24
End: 2024-06-17
Payer: COMMERCIAL

## 2024-06-17 VITALS — HEIGHT: 66 IN | BODY MASS INDEX: 20.89 KG/M2 | WEIGHT: 130 LBS

## 2024-06-17 DIAGNOSIS — Z12.83 ENCOUNTER FOR SCREENING FOR MALIGNANT NEOPLASM OF SKIN: ICD-10-CM

## 2024-06-17 DIAGNOSIS — D48.5 NEOPLASM OF UNCERTAIN BEHAVIOR OF SKIN: ICD-10-CM

## 2024-06-17 DIAGNOSIS — D22.9 MELANOCYTIC NEVI, UNSPECIFIED: ICD-10-CM

## 2024-06-17 DIAGNOSIS — L70.0 ACNE VULGARIS: ICD-10-CM

## 2024-06-17 DIAGNOSIS — D23.9 OTHER BENIGN NEOPLASM OF SKIN, UNSPECIFIED: ICD-10-CM

## 2024-06-17 DIAGNOSIS — Z78.9 OTHER SPECIFIED HEALTH STATUS: ICD-10-CM

## 2024-06-17 DIAGNOSIS — L21.9 SEBORRHEIC DERMATITIS, UNSPECIFIED: ICD-10-CM

## 2024-06-17 DIAGNOSIS — Z80.8 FAMILY HISTORY OF MALIGNANT NEOPLASM OF OTHER ORGANS OR SYSTEMS: ICD-10-CM

## 2024-06-17 PROCEDURE — 99204 OFFICE O/P NEW MOD 45 MIN: CPT | Mod: 25

## 2024-06-17 PROCEDURE — 11102 TANGNTL BX SKIN SINGLE LES: CPT

## 2024-06-17 RX ORDER — TRETINOIN 0.25 MG/G
0.03 CREAM TOPICAL
Qty: 1 | Refills: 2 | Status: ACTIVE | COMMUNITY
Start: 2019-12-31 | End: 1900-01-01

## 2024-06-17 RX ORDER — CLINDAMYCIN PHOSPHATE 10 MG/ML
1 SOLUTION TOPICAL
Qty: 1 | Refills: 10 | Status: ACTIVE | COMMUNITY
Start: 2018-12-31 | End: 1900-01-01

## 2024-06-17 RX ORDER — KETOCONAZOLE 20.5 MG/ML
2 SHAMPOO, SUSPENSION TOPICAL
Qty: 1 | Refills: 5 | Status: ACTIVE | COMMUNITY
Start: 2024-06-17 | End: 1900-01-01

## 2024-06-17 RX ORDER — BENZOYL PEROXIDE 5 G/100G
5 LIQUID TOPICAL
Qty: 1 | Refills: 3 | Status: ACTIVE | COMMUNITY
Start: 2024-06-17 | End: 1900-01-01

## 2024-06-18 ENCOUNTER — APPOINTMENT (OUTPATIENT)
Dept: INTERNAL MEDICINE | Facility: CLINIC | Age: 24
End: 2024-06-18
Payer: COMMERCIAL

## 2024-06-18 VITALS
SYSTOLIC BLOOD PRESSURE: 128 MMHG | WEIGHT: 134 LBS | HEART RATE: 71 BPM | OXYGEN SATURATION: 99 % | HEIGHT: 66 IN | BODY MASS INDEX: 21.53 KG/M2 | TEMPERATURE: 98.5 F | DIASTOLIC BLOOD PRESSURE: 80 MMHG

## 2024-06-18 DIAGNOSIS — M54.2 CERVICALGIA: ICD-10-CM

## 2024-06-18 DIAGNOSIS — G89.29 CERVICALGIA: ICD-10-CM

## 2024-06-18 DIAGNOSIS — M25.512 PAIN IN LEFT SHOULDER: ICD-10-CM

## 2024-06-18 DIAGNOSIS — M25.50 PAIN IN UNSPECIFIED JOINT: ICD-10-CM

## 2024-06-18 PROCEDURE — 99213 OFFICE O/P EST LOW 20 MIN: CPT

## 2024-06-18 RX ORDER — NAPROXEN 500 MG/1
500 TABLET ORAL
Qty: 30 | Refills: 0 | Status: ACTIVE | COMMUNITY
Start: 2019-03-21 | End: 1900-01-01

## 2024-06-18 NOTE — REVIEW OF SYSTEMS
[Earache] : earache [Joint Stiffness] : joint stiffness [Muscle Pain] : muscle pain [Back Pain] : back pain [Negative] : Gastrointestinal

## 2024-06-18 NOTE — DATA REVIEWED
[FreeTextEntry1] : EXAM: [id number] - US AXILLA ONLY LEFT  - ORDERED BY: REVA MARRUFO   PROCEDURE DATE:  04/02/2024    INTERPRETATION:  CLINICAL INDICATION: Left axillary swelling. Evaluate for lymphadenopathy.  TECHNIQUE: Targeted sonographic evaluation of the left axilla was performed. Images were obtained by the technologist and submitted for later interpretation.  COMPARISON: No similar prior ultrasound is available for comparison. Correlation is made to the left shoulder MRI dated 3/9/2024.  FINDINGS: No enlarged or morphologically abnormal appearing left axillary lymph nodes are demonstrated. There is a dominant left axillary lymph node measuring 1.5 x 0.6 x 1.7 cm with normal morphology. An additional normal-appearing left axillary lymph node measures 1.2 x 0.5 x 0.9 cm.  IMPRESSION: No left axillary lymphadenopathy.  --- End of Report ---       MATTY BERGMAN MD; Attending Radiologist This document has been electronically signed. Apr 4 2024  4:23PM

## 2024-06-18 NOTE — ASSESSMENT
[FreeTextEntry1] : 22 y/o F with LUE/L shoulder pain/tingling since Nov 2023. Had mid-sternal and mid upper back pain which has resolved. ROM of L shoulder has improved. Suspect more likely MSK injury. Has been evaluated by PM&R, Rheum. - Would continue PT, stretches, heat - Continue PM&R follow up - Interested in therapy - referred to [facility] (Gustavo-7 and PHQ-9 negative)

## 2024-06-30 LAB — DERMATOLOGY BIOPSY: NORMAL

## 2024-07-26 ENCOUNTER — APPOINTMENT (OUTPATIENT)
Age: 24
End: 2024-07-26
Payer: COMMERCIAL

## 2024-07-26 PROCEDURE — D0274: CPT

## 2024-07-26 PROCEDURE — D0120: CPT

## 2024-07-26 PROCEDURE — D0230: CPT

## 2024-07-26 PROCEDURE — D1110 PROPHYLAXIS - ADULT: CPT

## 2024-07-26 PROCEDURE — D0220: CPT

## 2024-07-30 ENCOUNTER — APPOINTMENT (OUTPATIENT)
Age: 24
End: 2024-07-30

## 2024-09-24 ENCOUNTER — APPOINTMENT (OUTPATIENT)
Age: 24
End: 2024-09-24
Payer: COMMERCIAL

## 2024-09-24 PROCEDURE — D0140: CPT

## 2024-09-24 PROCEDURE — D0220: CPT

## 2024-09-30 ENCOUNTER — APPOINTMENT (OUTPATIENT)
Dept: DERMATOLOGY | Facility: CLINIC | Age: 24
End: 2024-09-30
Payer: COMMERCIAL

## 2024-09-30 DIAGNOSIS — L21.9 SEBORRHEIC DERMATITIS, UNSPECIFIED: ICD-10-CM

## 2024-09-30 DIAGNOSIS — L91.0 HYPERTROPHIC SCAR: ICD-10-CM

## 2024-09-30 DIAGNOSIS — L70.0 ACNE VULGARIS: ICD-10-CM

## 2024-09-30 PROCEDURE — 99214 OFFICE O/P EST MOD 30 MIN: CPT

## 2024-09-30 RX ORDER — TRETINOIN 0.5 MG/G
0.05 CREAM TOPICAL
Qty: 1 | Refills: 5 | Status: ACTIVE | COMMUNITY
Start: 2024-09-30 | End: 1900-01-01

## 2024-09-30 NOTE — HISTORY OF PRESENT ILLNESS
[FreeTextEntry1] : acne; bx site [de-identified] : Ms. MICHAEL MARLOW is a 24year old F here for evaluation of below. LV 6/2024 for fbse  Problem: acne Location: face and back Duration: years Associated symptoms/Aggravating Factors: intermittently painful bumps Modifying factors/Treatments: OTC cleanser; in past use tretinoin 0.025% and clinda wipes which helped. Did not see much improvement with combined BP clinda gel. Was unable to tolerate doxycycline in past due to GI side effects - 9/2024: since LV in june, using SA cleanser, clinda lotion, tret 0.025% with improvement. Tolerating  # Seb derm of scalp - improved with keto shampoo  # Mole behind R knee. Believes it has been present since childhood but changed in shape over past 2 years. Asymptomatic.   - Bx in 6/2024 as benign, but now with bumpy scar. Asx   Personal hx of skin cancer: no FHx of skin cancer: no Social Hx: works for [employer]. went to [employer]

## 2024-09-30 NOTE — PHYSICAL EXAM
[Alert] : alert [Oriented x 3] : ~L oriented x 3 [Well Nourished] : well nourished [Conjunctiva Non-injected] : conjunctiva non-injected [No Visual Lymphadenopathy] : no visual  lymphadenopathy [No Clubbing] : no clubbing [No Edema] : no edema [No Bromhidrosis] : no bromhidrosis [No Chromhidrosis] : no chromhidrosis [Declined] : declined [FreeTextEntry3] : Focused exam only (see below) per patient request:  few comedones on forehead and few acneiform papules bl cheeks; improved than prior hypertrophic pink biopsy scar R popliteal fossa

## 2024-09-30 NOTE — ASSESSMENT
[FreeTextEntry1] : # Hypertrophic scar, R popliteal fossa Bx as benign nevus - Discussed ILK; pt defers for now - Gentle scar massage  # Acne vulgaris, mild-moderate, face & back - chronic, flaring though improved than baseline - Discussed chronic nature and course along with goals/expectations of therapy - C/w OTC SA wash qAM. SED including dryness, staining of towels - C/w clindamycin 1% swabs qAM after BPO wash - Increase to tretinoin 0.05% cream qHS. May start every three nights and increase to nightly as tolerated. SED including dryness, irritation, sun sensitivity   # Seb derm - chronic; stable - C/w keto shampoo every other day

## 2024-10-31 ENCOUNTER — APPOINTMENT (OUTPATIENT)
Dept: OBGYN | Facility: CLINIC | Age: 24
End: 2024-10-31
Payer: COMMERCIAL

## 2024-10-31 VITALS
WEIGHT: 139 LBS | BODY MASS INDEX: 22.34 KG/M2 | SYSTOLIC BLOOD PRESSURE: 116 MMHG | DIASTOLIC BLOOD PRESSURE: 79 MMHG | HEIGHT: 66 IN

## 2024-10-31 DIAGNOSIS — N94.6 DYSMENORRHEA, UNSPECIFIED: ICD-10-CM

## 2024-10-31 DIAGNOSIS — Z01.419 ENCOUNTER FOR GYNECOLOGICAL EXAMINATION (GENERAL) (ROUTINE) W/OUT ABNORMAL FINDINGS: ICD-10-CM

## 2024-10-31 PROCEDURE — 99459 PELVIC EXAMINATION: CPT

## 2024-10-31 PROCEDURE — 99385 PREV VISIT NEW AGE 18-39: CPT

## 2024-10-31 RX ORDER — NAPROXEN 500 MG/1
500 TABLET ORAL
Qty: 30 | Refills: 1 | Status: ACTIVE | COMMUNITY
Start: 2024-10-31 | End: 1900-01-01

## 2024-11-02 LAB
C TRACH RRNA SPEC QL NAA+PROBE: NOT DETECTED
N GONORRHOEA RRNA SPEC QL NAA+PROBE: NOT DETECTED
SOURCE TP AMPLIFICATION: NORMAL

## 2024-11-05 LAB — CYTOLOGY CVX/VAG DOC THIN PREP: NORMAL

## 2024-11-11 ENCOUNTER — APPOINTMENT (OUTPATIENT)
Dept: INTERNAL MEDICINE | Facility: CLINIC | Age: 24
End: 2024-11-11
Payer: COMMERCIAL

## 2024-11-11 VITALS
HEIGHT: 66 IN | WEIGHT: 136 LBS | TEMPERATURE: 98.3 F | BODY MASS INDEX: 21.86 KG/M2 | OXYGEN SATURATION: 98 % | DIASTOLIC BLOOD PRESSURE: 74 MMHG | HEART RATE: 89 BPM | SYSTOLIC BLOOD PRESSURE: 114 MMHG

## 2024-11-11 DIAGNOSIS — E04.1 NONTOXIC SINGLE THYROID NODULE: ICD-10-CM

## 2024-11-11 DIAGNOSIS — Z00.00 ENCOUNTER FOR GENERAL ADULT MEDICAL EXAMINATION W/OUT ABNORMAL FINDINGS: ICD-10-CM

## 2024-11-11 DIAGNOSIS — M25.512 PAIN IN LEFT SHOULDER: ICD-10-CM

## 2024-11-11 DIAGNOSIS — D49.7 NEOPLASM OF UNSPECIFIED BEHAVIOR OF ENDOCRINE GLANDS AND OTHER PARTS OF NERVOUS SYSTEM: ICD-10-CM

## 2024-11-11 PROCEDURE — 36415 COLL VENOUS BLD VENIPUNCTURE: CPT

## 2024-11-11 PROCEDURE — 99395 PREV VISIT EST AGE 18-39: CPT

## 2024-11-16 LAB
ALBUMIN SERPL ELPH-MCNC: 4.4 G/DL
ALP BLD-CCNC: 75 U/L
ALT SERPL-CCNC: 13 U/L
ANION GAP SERPL CALC-SCNC: 13 MMOL/L
AST SERPL-CCNC: 18 U/L
BILIRUB SERPL-MCNC: 0.4 MG/DL
BUN SERPL-MCNC: 10 MG/DL
CALCIUM SERPL-MCNC: 9.8 MG/DL
CALCIUM SERPL-MCNC: 9.8 MG/DL
CHLORIDE SERPL-SCNC: 105 MMOL/L
CHOLEST SERPL-MCNC: 198 MG/DL
CO2 SERPL-SCNC: 22 MMOL/L
CREAT SERPL-MCNC: 0.84 MG/DL
EGFR: 99 ML/MIN/1.73M2
ESTIMATED AVERAGE GLUCOSE: 111 MG/DL
GLUCOSE SERPL-MCNC: 84 MG/DL
HBA1C MFR BLD HPLC: 5.5 %
HCT VFR BLD CALC: 40.2 %
HDLC SERPL-MCNC: 75 MG/DL
HGB BLD-MCNC: 13.4 G/DL
HIV1+2 AB SPEC QL IA.RAPID: NONREACTIVE
LDLC SERPL CALC-MCNC: 114 MG/DL
MCHC RBC-ENTMCNC: 30.9 PG
MCHC RBC-ENTMCNC: 33.3 G/DL
MCV RBC AUTO: 92.6 FL
NONHDLC SERPL-MCNC: 124 MG/DL
PARATHYROID HORMONE INTACT: 20 PG/ML
PHOSPHATE SERPL-MCNC: 3.8 MG/DL
PLATELET # BLD AUTO: 379 K/UL
POTASSIUM SERPL-SCNC: 4.6 MMOL/L
PROT SERPL-MCNC: 7 G/DL
RBC # BLD: 4.34 M/UL
RBC # FLD: 12 %
SODIUM SERPL-SCNC: 140 MMOL/L
TRIGL SERPL-MCNC: 50 MG/DL
TSH SERPL-ACNC: 0.97 UIU/ML
WBC # FLD AUTO: 7.47 K/UL

## 2024-11-19 ENCOUNTER — APPOINTMENT (OUTPATIENT)
Dept: OBGYN | Facility: CLINIC | Age: 24
End: 2024-11-19

## 2024-11-19 PROCEDURE — 76856 US EXAM PELVIC COMPLETE: CPT

## 2024-11-27 ENCOUNTER — TRANSCRIPTION ENCOUNTER (OUTPATIENT)
Age: 24
End: 2024-11-27

## 2025-01-14 ENCOUNTER — APPOINTMENT (OUTPATIENT)
Dept: ULTRASOUND IMAGING | Facility: IMAGING CENTER | Age: 25
End: 2025-01-14
Payer: COMMERCIAL

## 2025-01-14 ENCOUNTER — OUTPATIENT (OUTPATIENT)
Dept: OUTPATIENT SERVICES | Facility: HOSPITAL | Age: 25
LOS: 1 days | End: 2025-01-14
Payer: COMMERCIAL

## 2025-01-14 DIAGNOSIS — Z00.8 ENCOUNTER FOR OTHER GENERAL EXAMINATION: ICD-10-CM

## 2025-01-14 DIAGNOSIS — D49.7 NEOPLASM OF UNSPECIFIED BEHAVIOR OF ENDOCRINE GLANDS AND OTHER PARTS OF NERVOUS SYSTEM: ICD-10-CM

## 2025-01-14 PROCEDURE — 76536 US EXAM OF HEAD AND NECK: CPT | Mod: 26

## 2025-01-14 PROCEDURE — 76536 US EXAM OF HEAD AND NECK: CPT

## 2025-01-30 ENCOUNTER — APPOINTMENT (OUTPATIENT)
Dept: ORTHOPEDIC SURGERY | Facility: CLINIC | Age: 25
End: 2025-01-30
Payer: OTHER MISCELLANEOUS

## 2025-01-30 ENCOUNTER — NON-APPOINTMENT (OUTPATIENT)
Age: 25
End: 2025-01-30

## 2025-01-30 VITALS
SYSTOLIC BLOOD PRESSURE: 112 MMHG | DIASTOLIC BLOOD PRESSURE: 77 MMHG | HEIGHT: 66 IN | HEART RATE: 80 BPM | WEIGHT: 130 LBS | BODY MASS INDEX: 20.89 KG/M2

## 2025-01-30 DIAGNOSIS — S89.91XA UNSPECIFIED INJURY OF RIGHT LOWER LEG, INITIAL ENCOUNTER: ICD-10-CM

## 2025-01-30 DIAGNOSIS — S89.92XA UNSPECIFIED INJURY OF LEFT LOWER LEG, INITIAL ENCOUNTER: ICD-10-CM

## 2025-01-30 DIAGNOSIS — S99.912A UNSPECIFIED INJURY OF LEFT ANKLE, INITIAL ENCOUNTER: ICD-10-CM

## 2025-01-30 PROCEDURE — 99203 OFFICE O/P NEW LOW 30 MIN: CPT

## 2025-01-30 PROCEDURE — 73630 X-RAY EXAM OF FOOT: CPT | Mod: LT

## 2025-01-30 PROCEDURE — 73610 X-RAY EXAM OF ANKLE: CPT | Mod: LT

## 2025-01-30 PROCEDURE — 73564 X-RAY EXAM KNEE 4 OR MORE: CPT | Mod: 50

## 2025-01-31 ENCOUNTER — APPOINTMENT (OUTPATIENT)
Age: 25
End: 2025-01-31

## 2025-02-27 ENCOUNTER — APPOINTMENT (OUTPATIENT)
Dept: ORTHOPEDIC SURGERY | Facility: CLINIC | Age: 25
End: 2025-02-27
Payer: OTHER MISCELLANEOUS

## 2025-02-27 VITALS
HEIGHT: 66 IN | WEIGHT: 130 LBS | BODY MASS INDEX: 20.89 KG/M2 | SYSTOLIC BLOOD PRESSURE: 114 MMHG | DIASTOLIC BLOOD PRESSURE: 77 MMHG | HEART RATE: 96 BPM

## 2025-02-27 DIAGNOSIS — S99.912D UNSPECIFIED INJURY OF LEFT ANKLE, SUBSEQUENT ENCOUNTER: ICD-10-CM

## 2025-02-27 DIAGNOSIS — S89.92XD UNSPECIFIED INJURY OF LEFT LOWER LEG, SUBSEQUENT ENCOUNTER: ICD-10-CM

## 2025-02-27 PROCEDURE — 99213 OFFICE O/P EST LOW 20 MIN: CPT

## 2025-03-27 ENCOUNTER — APPOINTMENT (OUTPATIENT)
Dept: ORTHOPEDIC SURGERY | Facility: CLINIC | Age: 25
End: 2025-03-27
Payer: OTHER MISCELLANEOUS

## 2025-03-27 ENCOUNTER — RX RENEWAL (OUTPATIENT)
Age: 25
End: 2025-03-27

## 2025-03-27 VITALS — BODY MASS INDEX: 20.89 KG/M2 | HEIGHT: 66 IN | WEIGHT: 130 LBS

## 2025-03-27 DIAGNOSIS — S89.92XD UNSPECIFIED INJURY OF LEFT LOWER LEG, SUBSEQUENT ENCOUNTER: ICD-10-CM

## 2025-03-27 DIAGNOSIS — S99.912D UNSPECIFIED INJURY OF LEFT ANKLE, SUBSEQUENT ENCOUNTER: ICD-10-CM

## 2025-03-27 PROCEDURE — 99213 OFFICE O/P EST LOW 20 MIN: CPT

## 2025-04-22 ENCOUNTER — NON-APPOINTMENT (OUTPATIENT)
Age: 25
End: 2025-04-22

## 2025-04-24 ENCOUNTER — APPOINTMENT (OUTPATIENT)
Dept: ORTHOPEDIC SURGERY | Facility: CLINIC | Age: 25
End: 2025-04-24
Payer: OTHER MISCELLANEOUS

## 2025-04-24 VITALS
WEIGHT: 130 LBS | HEART RATE: 112 BPM | SYSTOLIC BLOOD PRESSURE: 113 MMHG | DIASTOLIC BLOOD PRESSURE: 77 MMHG | HEIGHT: 66 IN | BODY MASS INDEX: 20.89 KG/M2

## 2025-04-24 DIAGNOSIS — S99.912D UNSPECIFIED INJURY OF LEFT ANKLE, SUBSEQUENT ENCOUNTER: ICD-10-CM

## 2025-04-24 DIAGNOSIS — S89.92XD UNSPECIFIED INJURY OF LEFT LOWER LEG, SUBSEQUENT ENCOUNTER: ICD-10-CM

## 2025-04-24 DIAGNOSIS — S89.91XD UNSPECIFIED INJURY OF RIGHT LOWER LEG, SUBSEQUENT ENCOUNTER: ICD-10-CM

## 2025-04-24 PROCEDURE — 99213 OFFICE O/P EST LOW 20 MIN: CPT

## 2025-05-10 ENCOUNTER — APPOINTMENT (OUTPATIENT)
Dept: MRI IMAGING | Facility: IMAGING CENTER | Age: 25
End: 2025-05-10

## 2025-05-10 ENCOUNTER — RESULT REVIEW (OUTPATIENT)
Age: 25
End: 2025-05-10

## 2025-05-10 ENCOUNTER — OUTPATIENT (OUTPATIENT)
Dept: OUTPATIENT SERVICES | Facility: HOSPITAL | Age: 25
LOS: 1 days | End: 2025-05-10
Payer: COMMERCIAL

## 2025-05-10 DIAGNOSIS — S89.91XA UNSPECIFIED INJURY OF RIGHT LOWER LEG, INITIAL ENCOUNTER: ICD-10-CM

## 2025-05-10 DIAGNOSIS — Z00.8 ENCOUNTER FOR OTHER GENERAL EXAMINATION: ICD-10-CM

## 2025-05-10 DIAGNOSIS — S89.92XA UNSPECIFIED INJURY OF LEFT LOWER LEG, INITIAL ENCOUNTER: ICD-10-CM

## 2025-05-10 PROCEDURE — 73721 MRI JNT OF LWR EXTRE W/O DYE: CPT | Mod: 26,LT

## 2025-05-10 PROCEDURE — 73721 MRI JNT OF LWR EXTRE W/O DYE: CPT

## 2025-05-22 ENCOUNTER — APPOINTMENT (OUTPATIENT)
Dept: ORTHOPEDIC SURGERY | Facility: CLINIC | Age: 25
End: 2025-05-22
Payer: OTHER MISCELLANEOUS

## 2025-05-22 VITALS
SYSTOLIC BLOOD PRESSURE: 105 MMHG | HEIGHT: 66 IN | HEART RATE: 97 BPM | WEIGHT: 130 LBS | BODY MASS INDEX: 20.89 KG/M2 | DIASTOLIC BLOOD PRESSURE: 74 MMHG

## 2025-05-22 DIAGNOSIS — S99.912D UNSPECIFIED INJURY OF LEFT ANKLE, SUBSEQUENT ENCOUNTER: ICD-10-CM

## 2025-05-22 DIAGNOSIS — S89.91XD UNSPECIFIED INJURY OF RIGHT LOWER LEG, SUBSEQUENT ENCOUNTER: ICD-10-CM

## 2025-05-22 DIAGNOSIS — S89.92XD UNSPECIFIED INJURY OF LEFT LOWER LEG, SUBSEQUENT ENCOUNTER: ICD-10-CM

## 2025-05-22 PROCEDURE — 99214 OFFICE O/P EST MOD 30 MIN: CPT

## 2025-07-03 ENCOUNTER — APPOINTMENT (OUTPATIENT)
Dept: ORTHOPEDIC SURGERY | Facility: CLINIC | Age: 25
End: 2025-07-03
Payer: OTHER MISCELLANEOUS

## 2025-07-03 VITALS
DIASTOLIC BLOOD PRESSURE: 77 MMHG | HEART RATE: 102 BPM | BODY MASS INDEX: 20.89 KG/M2 | WEIGHT: 130 LBS | HEIGHT: 66 IN | SYSTOLIC BLOOD PRESSURE: 110 MMHG

## 2025-07-03 PROCEDURE — 99213 OFFICE O/P EST LOW 20 MIN: CPT

## 2025-07-25 ENCOUNTER — NON-APPOINTMENT (OUTPATIENT)
Age: 25
End: 2025-07-25

## 2025-08-21 ENCOUNTER — APPOINTMENT (OUTPATIENT)
Dept: ORTHOPEDIC SURGERY | Facility: CLINIC | Age: 25
End: 2025-08-21

## 2025-08-22 ENCOUNTER — APPOINTMENT (OUTPATIENT)
Dept: ORTHOPEDIC SURGERY | Facility: CLINIC | Age: 25
End: 2025-08-22
Payer: OTHER MISCELLANEOUS

## 2025-08-22 VITALS — WEIGHT: 135 LBS | HEIGHT: 66 IN | BODY MASS INDEX: 21.69 KG/M2

## 2025-08-22 VITALS — HEART RATE: 82 BPM | DIASTOLIC BLOOD PRESSURE: 76 MMHG | SYSTOLIC BLOOD PRESSURE: 109 MMHG

## 2025-08-22 DIAGNOSIS — S89.92XD UNSPECIFIED INJURY OF LEFT LOWER LEG, SUBSEQUENT ENCOUNTER: ICD-10-CM

## 2025-08-22 DIAGNOSIS — S89.91XD UNSPECIFIED INJURY OF RIGHT LOWER LEG, SUBSEQUENT ENCOUNTER: ICD-10-CM

## 2025-08-22 DIAGNOSIS — S99.912D UNSPECIFIED INJURY OF LEFT ANKLE, SUBSEQUENT ENCOUNTER: ICD-10-CM

## 2025-08-22 PROCEDURE — 99213 OFFICE O/P EST LOW 20 MIN: CPT
